# Patient Record
Sex: FEMALE | Race: WHITE
[De-identification: names, ages, dates, MRNs, and addresses within clinical notes are randomized per-mention and may not be internally consistent; named-entity substitution may affect disease eponyms.]

---

## 2020-02-28 ENCOUNTER — HOSPITAL ENCOUNTER (OUTPATIENT)
Dept: HOSPITAL 50 - VM.ED | Age: 85
Setting detail: OBSERVATION
LOS: 3 days | Discharge: NURSING, REHAB OR LONG-TERM CARE | End: 2020-03-02
Attending: FAMILY MEDICINE | Admitting: PHYSICIAN ASSISTANT
Payer: MEDICARE

## 2020-02-28 DIAGNOSIS — Z91.048: ICD-10-CM

## 2020-02-28 DIAGNOSIS — I13.0: ICD-10-CM

## 2020-02-28 DIAGNOSIS — N18.9: ICD-10-CM

## 2020-02-28 DIAGNOSIS — F41.9: ICD-10-CM

## 2020-02-28 DIAGNOSIS — S42.212A: ICD-10-CM

## 2020-02-28 DIAGNOSIS — Z88.1: ICD-10-CM

## 2020-02-28 DIAGNOSIS — Z79.82: ICD-10-CM

## 2020-02-28 DIAGNOSIS — Z88.8: ICD-10-CM

## 2020-02-28 DIAGNOSIS — S01.01XA: ICD-10-CM

## 2020-02-28 DIAGNOSIS — S52.92XA: ICD-10-CM

## 2020-02-28 DIAGNOSIS — Z79.51: ICD-10-CM

## 2020-02-28 DIAGNOSIS — S52.202A: ICD-10-CM

## 2020-02-28 DIAGNOSIS — Z79.899: ICD-10-CM

## 2020-02-28 DIAGNOSIS — E83.119: ICD-10-CM
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
## 2020-02-28 DIAGNOSIS — Z23: ICD-10-CM

## 2020-02-28 DIAGNOSIS — D50.0: ICD-10-CM

## 2020-02-28 DIAGNOSIS — N39.0: ICD-10-CM

## 2020-02-28 DIAGNOSIS — Z79.02: ICD-10-CM

## 2020-02-28 DIAGNOSIS — E87.1: ICD-10-CM

## 2020-02-28 DIAGNOSIS — J45.909: ICD-10-CM

## 2020-02-28 DIAGNOSIS — E83.42: ICD-10-CM

## 2020-02-28 DIAGNOSIS — S42.292A: Primary | ICD-10-CM

## 2020-02-28 DIAGNOSIS — I48.0: ICD-10-CM

## 2020-02-28 DIAGNOSIS — I50.22: ICD-10-CM

## 2020-02-28 PROCEDURE — 81002 URINALYSIS NONAUTO W/O SCOPE: CPT

## 2020-02-28 PROCEDURE — 85610 PROTHROMBIN TIME: CPT

## 2020-02-28 PROCEDURE — 99285 EMERGENCY DEPT VISIT HI MDM: CPT

## 2020-02-28 PROCEDURE — 73060 X-RAY EXAM OF HUMERUS: CPT

## 2020-02-28 PROCEDURE — 70450 CT HEAD/BRAIN W/O DYE: CPT

## 2020-02-28 PROCEDURE — 72125 CT NECK SPINE W/O DYE: CPT

## 2020-02-28 PROCEDURE — 96374 THER/PROPH/DIAG INJ IV PUSH: CPT

## 2020-02-28 PROCEDURE — 36415 COLL VENOUS BLD VENIPUNCTURE: CPT

## 2020-02-28 PROCEDURE — 71045 X-RAY EXAM CHEST 1 VIEW: CPT

## 2020-02-28 PROCEDURE — 90471 IMMUNIZATION ADMIN: CPT

## 2020-02-28 PROCEDURE — 97165 OT EVAL LOW COMPLEX 30 MIN: CPT

## 2020-02-28 PROCEDURE — 99284 EMERGENCY DEPT VISIT MOD MDM: CPT

## 2020-02-28 PROCEDURE — 96375 TX/PRO/DX INJ NEW DRUG ADDON: CPT

## 2020-02-28 PROCEDURE — 73110 X-RAY EXAM OF WRIST: CPT

## 2020-02-28 PROCEDURE — 80053 COMPREHEN METABOLIC PANEL: CPT

## 2020-02-28 PROCEDURE — 12001 RPR S/N/AX/GEN/TRNK 2.5CM/<: CPT

## 2020-02-28 PROCEDURE — G0378 HOSPITAL OBSERVATION PER HR: HCPCS

## 2020-02-28 PROCEDURE — 73030 X-RAY EXAM OF SHOULDER: CPT

## 2020-02-28 PROCEDURE — 80048 BASIC METABOLIC PNL TOTAL CA: CPT

## 2020-02-28 PROCEDURE — 90715 TDAP VACCINE 7 YRS/> IM: CPT

## 2020-02-28 PROCEDURE — 29125 APPL SHORT ARM SPLINT STATIC: CPT

## 2020-02-28 PROCEDURE — 83735 ASSAY OF MAGNESIUM: CPT

## 2020-02-28 PROCEDURE — 73130 X-RAY EXAM OF HAND: CPT

## 2020-02-28 PROCEDURE — 85025 COMPLETE CBC W/AUTO DIFF WBC: CPT

## 2020-02-28 NOTE — EDM.PDOC
ED HPI GENERAL MEDICAL PROBLEM





- General


Time Seen by Provider: 02/28/20 23:19


Source of Information: Reports: Patient, EMS


History Limitations: Reports: No Limitations





- History of Present Illness


INITIAL COMMENTS - FREE TEXT/NARRATIVE: 





Pt. states that she tripped over a rug at home, causing her to fall and strike 

her head and injure her L wrist. Pt. states that she had to crawl to get to her 

phone to call 911. Pt. did not have any LOC and recalls the entire event. 

Denies any neck pain. Pt. denies any head pain, but complains of 8/10 pain to 

her hand and wrist.


EMS called a trauma code due to patient's age and due to the fact that she is 

on Plavix. She is not anticoagulated.


Pt. denies any hip or pelvis pain. She was able to ambulate and stand without 

difficulty. She denied any lightheadedness when changing positions. Denies any 

nausea, vomiting, blurred vision.


Pt. states that she has been experiencing dysuria and frequency of urination. 

According to her chart, she called the clinic yesterday but decision was made 

for patient to follow-up in clinic for an office visit next week. She denies 

any fever or chills, back or flank pain. She states that she has not been 

experiencing any weakness, lightheadedness, chest pain, or shortness of breath.


Her complaints include discomfort to L wrist, L hand, and L humerus.





Onset: Today


Onset Date: 02/28/20


Location: Reports: Head, Upper Extremity, Left


Quality: Reports: Ache, Sharp


Severity: Moderate





- Related Data


 Allergies











Allergy/AdvReac Type Severity Reaction Status Date / Time


 


amoxicillin [Amoxicillin] Allergy  Swollen Verified 04/26/15 07:32





   Tongue  


 


doxycycline Allergy  Rash Verified 04/26/15 07:32


 


seasonal Allergy  Other Uncoded 02/28/20 23:48


 


wasp Allergy  Anaphylactic Uncoded 02/28/20 23:48





   Shock  











Home Meds: 


 Home Meds





Aspirin [Ecotrin] 162 mg PO DAILY 03/11/15 [History]


Calcium Carbonate/Vitamin D3 [Caltrate 600 + D Tablet] 1 tab PO DAILY 03/11/15 [

History]


Fluticasone Propionate [Flovent HFA] 2 puff INH BID PRN 03/11/15 [History]


Lutein 20 mg PO DAILY 03/11/15 [History]


Simvastatin 10 mg PO DAILY 03/11/15 [History]


timoloL maleate [Timoptic 0.5% Ophth Soln] 1 drop EYEBOTH BID 03/11/15 [History]


Albuterol [Ventolin HFA] 2 puff .XX Q4H PRN 02/28/20 [History]


Amiodarone HCl [Pacerone] 100 mg PO DAILY 02/28/20 [History]


Carboxymethylcellulos/Glycerin [Refresh Optive Gel Eye Drops] 10 ml OP 

ASDIRECTED 02/28/20 [History]


Clopidogrel [Plavix] 75 mg PO DAILY 02/28/20 [History]


Fenofibrate Nanocrystallized [Tricor] 145 mg PO DAILY 02/28/20 [History]


Furosemide 1 tab PO DAILY 02/28/20 [History]


Multivitamin with Minerals [Multivitamins with Minerals] 1 each PO DAILY 02/28/ 20 [History]


Nitroglycerin [Nitrostat] 0.4 mg SL ASDIRECTED PRN 02/28/20 [History]


Omeprazole 20 mg PO DAILY 02/28/20 [History]


Simethicone 80 mg PO Q4H PRN 02/28/20 [History]


Sodium Chloride 5% [Barbara 128 5% Ophth Oint] 3.5 gm EYEBOTH BEDTIME 02/28/20 [

History]


amLODIPine [Norvasc] 5 mg PO DAILY 02/28/20 [History]


carvediloL [Coreg] 6.25 mg PO BID 02/28/20 [History]


clonazePAM [Clonazepam] 0.5 tab PO QAM 02/28/20 [History]


clonazePAM [Clonazepam] 1 tab PO BEDTIME 02/28/20 [History]


cycloSPORINE [Restasis] 1 each OP BID 02/28/20 [History]


lisinopriL [Lisinopril] 1 tab PO BID 02/28/20 [History]


minoxidiL [Minoxidil] 60 ml TP ASDIRECTED 02/28/20 [History]











Past Medical History


OB/GYN History: Reports: Pregnancy





ED ROS GENERAL





- Review of Systems


Review Of Systems: See Below


Constitutional: Reports: No Symptoms.  Denies: Fever, Chills, Malaise, Weakness


HEENT: Reports: Other (Abrasion to head)


Respiratory: Reports: No Symptoms


Cardiovascular: Reports: No Symptoms


Endocrine: Reports: No Symptoms


GI/Abdominal: Reports: No Symptoms


: Reports: No Symptoms


Musculoskeletal: Reports: Arm Pain (L upper arm), Hand Pain, Joint Pain, Joint 

Swelling (wrist pain)


Skin: Reports: No Symptoms


Neurological: Reports: No Symptoms.  Denies: Confusion, Dizziness, Headache, 

Numbness, Seizure, Syncope, Tingling, Weakness


Psychiatric: Reports: No Symptoms


Hematologic/Lymphatic: Reports: No Symptoms


Immunologic: Reports: No Symptoms





ED EXAM, GENERAL





- Physical Exam


Exam: See Below


Exam Limited By: No Limitations


General Appearance: Alert, WD/WN, No Apparent Distress


Eye Exam: Bilateral Eye: EOMI, Normal Fundi, Normal Inspection, PERRL


Throat/Mouth: Normal Inspection, Normal Lips, Normal Teeth, Normal Gums, Normal 

Oropharynx, Normal Voice, No Airway Compromise


Neck: Normal Inspection, Supple, Non-Tender, Full Range of Motion


Respiratory/Chest: No Respiratory Distress, Lungs Clear, Normal Breath Sounds, 

No Accessory Muscle Use, Chest Non-Tender


Cardiovascular: Normal Peripheral Pulses, Regular Rate, Rhythm, No Edema, No JVD

, No Murmur, No Rub


Peripheral Pulses: 4+: Radial (L)


GI/Abdominal: Normal Bowel Sounds, Soft, Non-Tender, No Distention, No Mass, 

Pelvis Stable


 (Female) Exam: Deferred


Rectal (Female) Exam: Deferred


Back Exam: Normal Inspection, Full Range of Motion


Extremities: Joint Swelling, Arm Pain, Limited Range of Motion, Other (

decreased ROM/ecchymosis to L wrist/hand)


Neurological: Alert, Oriented, CN II-XII Intact, Normal Cognition, Normal Gait, 

Normal Reflexes, No Motor/Sensory Deficits


Psychiatric: Normal Affect, Normal Mood


Skin Exam: Warm, Dry, Intact, Normal Color, No Rash





ED GENERAL MEDICAL PROCEDURES





- Laceration/Wound Repair


  ** Posterior Head


Lac/wound length in cm: 2.5


Appearance: Subcutaneous


Anesthetic Type: Other (None)


Skin Prep: Chlorhexidine (Hibiciens), Saline, Sterile Drape


Exploration/Debridement/Repair: Wound Explored


Closed with: Staples


# of Sutures: 4





- Splinting


  ** Left Upper Extremity


Splint Site: volar 


Pre-procedure NV status: Normal


Post-procedure NV status: Normal


Splint Material: Fiberglass


Splint Design: Volar


Applied & Form Fitted By: Provider, Nurse


Provider Post-Splint Application NV Check: NV Status Normal, Good Position


Complications: No





Course





- Orders/Labs/Meds


Orders: 


 Active Orders 24 hr











 Category Date Time Status


 


 Cervical Spine wo Cont [CT] Stat Exams  02/28/20 23:20 Ordered


 


 Chest 1V Frontal [CR] Stat Exams  02/28/20 23:21 Ordered


 


 Hand Comp Min 3V Lt [CR] Stat Exams  02/28/20 23:22 Ordered


 


 Head wo Cont [CT] Stat Exams  02/28/20 23:19 Ordered


 


 Humerus Lt [CR] Stat Exams  02/29/20 00:15 Ordered


 


 Wrist Comp Min 3V Lt [CR] Stat Exams  02/28/20 23:22 Ordered


 


 COMPREHENSIVE METABOLIC PN,CMP [CHEM] Stat Lab  02/28/20 23:23 Ordered


 


 MAGNESIUM [CHEM] Stat Lab  02/28/20 23:23 Ordered


 


 UA W/MICROSCOPIC [URIN] Stat Lab  02/28/20 23:23 Ordered











Labs: 


 Laboratory Tests











  02/28/20 02/28/20 Range/Units





  23:44 23:44 


 


WBC  9.3   (4.0-10.0)  x10^3/uL


 


RBC  3.38 L   (4.00-5.50)  x10^6/uL


 


Hgb  11.3 L   (12.0-16.0)  g/dL


 


Hct  32.6 L   (33.0-47.0)  %


 


MCV  96.4 H   (78.0-93.0)  fL


 


MCH  33.4 H   (26.0-32.0)  pg


 


MCHC  34.7   (32.0-36.0)  g/dL


 


RDW Coeff of Maryan  14.1   (10.0-15.0)  %


 


Plt Count  192   (130-400)  x10^3/uL


 


Neut % (Auto)  77.6   (50.0-80.0)  %


 


Lymph % (Auto)  13.1 L   (25.0-50.0)  %


 


Mono % (Auto)  7.0   (2.0-11.0)  %


 


Eos % (Auto)  2.2   (0.0-4.0)  %


 


Baso % (Auto)  0.1 L   (0.2-1.2)  %


 


PT   12.4  (10.0-12.8)  SEC


 


INR   1.1 L  (2.0-3.5)  











Meds: 


Medications














Discontinued Medications














Generic Name Dose Route Start Last Admin





  Trade Name Freq  PRN Reason Stop Dose Admin


 


Morphine Sulfate  4 mg  02/28/20 23:50  02/29/20 00:04





  Morphine  IVPUSH  02/28/20 23:51  4 mg





  ONETIME ONE   Administration





     





     





     





     


 


Ondansetron HCl  4 mg  02/28/20 23:51  02/29/20 00:04





  Zofran  IVPUSH  02/28/20 23:52  4 mg





  ONETIME ONE   Administration





     





     





     





     














- Radiology Interpretation


Free Text/Narrative:: 





CT brain negative for acute pathology


CT cervical spine negative for acute pathology


3 view L wrist reveals distal radius and ulna fractures


L humerus radiographs reveal fracture of proximal humerus near glenoid


chest x-ray did not reveal any acute pathology








Departure





- Departure


Time of Disposition: 01:26


Disposition: Refer to Observation


Clinical Impression: 


 Radius and ulna distal fracture, Fracture of humerus, proximal, left, closed, 

Scalp laceration








- Discharge Information


Referrals: 


PCP,Unobtain [Primary Care Provider] - 





Sepsis Event Note





- Focused Exam


Date Exam was Performed: 02/29/20


Time Exam was Performed: 00:16





- Problem List Review


Problem List Initiated/Reviewed/Updated: Yes





- My Orders


Last 24 Hours: 


My Active Orders





02/28/20 23:19


Head wo Cont [CT] Stat 





02/28/20 23:20


Cervical Spine wo Cont [CT] Stat 





02/28/20 23:21


Chest 1V Frontal [CR] Stat 





02/28/20 23:22


Hand Comp Min 3V Lt [CR] Stat 


Wrist Comp Min 3V Lt [CR] Stat 





02/28/20 23:23


COMPREHENSIVE METABOLIC PN,CMP [CHEM] Stat 


MAGNESIUM [CHEM] Stat 


UA W/MICROSCOPIC [URIN] Stat 





02/29/20 00:15


Humerus Lt [CR] Stat 














- Assessment/Plan


Admission H&P: Please use this note as an admission H&P


Last 24 Hours: 


My Active Orders





02/28/20 23:19


Head wo Cont [CT] Stat 





02/28/20 23:20


Cervical Spine wo Cont [CT] Stat 





02/28/20 23:21


Chest 1V Frontal [CR] Stat 





02/28/20 23:22


Hand Comp Min 3V Lt [CR] Stat 


Wrist Comp Min 3V Lt [CR] Stat 





02/28/20 23:23


COMPREHENSIVE METABOLIC PN,CMP [CHEM] Stat 


MAGNESIUM [CHEM] Stat 


UA W/MICROSCOPIC [URIN] Stat 





02/29/20 00:15


Humerus Lt [CR] Stat 











Plan: 





Pt. will be admitted observation. I spoke with Dr. Starr, Orthopedics at 

[facility] in [place]. He advised splinting the wrist and placing the patient 

in a sling and having the patient follow-up in clinic in a week. She does not 

meet acute criteria but will be admitted observation now. Patient will be 

started on Norco for pain control. She will be followed by Kenroy Castro in the 

morning. All questions were answered. Pt. is a code 1.

## 2020-02-29 LAB
ANION GAP SERPL CALC-SCNC: 13.8 MMOL/L (ref 10–20)
CHLORIDE SERPL-SCNC: 100 MMOL/L (ref 98–107)
SODIUM SERPL-SCNC: 136 MMOL/L (ref 136–145)

## 2020-02-29 RX ADMIN — TIMOLOL MALEATE SCH DROP: 5 SOLUTION OPHTHALMIC at 21:49

## 2020-02-29 RX ADMIN — Medication SCH TAB: at 08:30

## 2020-02-29 RX ADMIN — OMEPRAZOLE SCH MG: 20 CAPSULE, DELAYED RELEASE ORAL at 06:08

## 2020-02-29 RX ADMIN — FENOFIBRATE SCH: 134 CAPSULE ORAL at 08:38

## 2020-02-29 RX ADMIN — TIMOLOL MALEATE SCH DROP: 5 SOLUTION OPHTHALMIC at 08:31

## 2020-02-29 RX ADMIN — HYDROCODONE BITARTRATE AND ACETAMINOPHEN PRN TAB: 5; 325 TABLET ORAL at 16:37

## 2020-02-29 RX ADMIN — Medication SCH: at 04:52

## 2020-02-29 RX ADMIN — MAGNESIUM OXIDE TAB 400 MG (241.3 MG ELEMENTAL MG) SCH TAB: 400 (241.3 MG) TAB at 08:30

## 2020-02-29 NOTE — PN
Progress Note for GRAYSON CARL  Date:  02/29/2020  Room #:  VM.204

 

CHIEF COMPLAINT:  Fall.

 

HISTORY OF PRESENT ILLNESS:  An 84-year-old female patient, who was seen in the

emergency room last evening after she sustained a fall at home.  The patient

states that she was ambulating in her bathroom when she tripped and fell,

hitting her head on the lip of the tub and landing on her left side.  The

patient states that it took her quite some time to reach her telephone to call

911 as she was not able to get up from the floor.  Upon arrival to the emergency

room at [facility], the patient was alert and oriented.  The patient had

denied any LOC, the patient remembers the entire incident.  A trauma code was

called on this patient given her age and she is also on Plavix.  The patient did

not have any hip or pelvic pain.  The patient was able to ambulate in the

emergency room and stand without any difficulty.  The patient did not have any

focal neurological deficit.  The patient had complained of dysuria and

frequency.  Her UA on arrival was negative.  The patient did not have any chest

pain or shortness of breath.  Patient had denied any abdominal complaints.  The

patient did not have any headache, dizziness, or lightheadedness.  The patient

did sustain a 2.5-cm posterior scalp laceration which was closed with 4 staples

in the emergency room.  X-rays were obtained of the patient's left upper

extremity.  The CT of the head was negative as well as the chest x-ray.  The CT

of the C-spine was also negative.  However, the x-ray of the patient's left hand

showed an impacted distal left radial and ulnar metaphysis fracture with volar

displacement of the distal radius.  The x-ray of the humerus revealed impacted

left humeral neck fracture.  The emergency room provider did contact Orthopedics

at [facility] who recommended splinting and placing the left upper extremity

in a sling.  The patient was admitted to observation for pain control and to

observe for any neurovascular compromise of the left upper extremity.

 

REVIEW OF SYSTEMS:

General:  Negative.

Respiratory:  Negative.

Cardiovascular:  Negative.

Abdomen:  Negative.

Musculoskeletal:  The patient complains of some left upper extremity pain,

however, she states it appears to be well controlled; patient has full range of

motion and use of the right upper extremity.

Neurological: Patient denies any headaches.  Patient denies any neck or back

pain.  The patient denies any dizziness or lightheadedness.

 

PHYSICAL EXAMINATION:

General Presentation:  The patient is alert.  Patient is cooperative.  Patient

does not appear to be in any acute distress.

Respiratory:  Lungs are clear to auscultation bilaterally.

Cardiovascular:  Regular rate and rhythm, no murmurs.

Abdomen:  Soft, nontender.  Bowel sounds are active x4.

Musculoskeletal:  The patient is tender along the proximal humerus.  Unable to

assess the wrist and forearm as it is currently in a splint.

Neurological:  The patient is alert and oriented x3.  Sensation is intact.

 

LABORATORY WORK:

1. CBC, white blood cell count 9.3, hemoglobin 11.3, hematocrit 32.6,

    platelets are 192,000.

2. Coagulation studies, PT/INR 1.1.

3. CMP, sodium 136, potassium 3.8, chloride 100, CO2 is 26, anion gap is 13.8,

    BUN is 26, creatinine 1.2, GFR is 43, glucose 149, calcium 8.7, bilirubin

    0.5, AST 73, ALT is 53, alkaline phosphatase is 98, total protein 6.7,

    albumin 3.3.

4. Magnesium 1.5.

5. UA:  pH is 6.5, specific gravity 1.015, negative for leukocyte esterase,

    negative wbc's, small occult blood, negative nitrites.

 

ASSESSMENT:

1. Impacted distal left radial and ulnar metaphysis fracture with volar

    displacement of distal radius.

2. Impacted left humeral neck fracture.

3. Fall at home.

4. Mixed hyperlipidemia.

5. Coronary artery disease.

6. Hypertension.

7. Gastroesophageal reflux disease.

 

PLAN:  The patient will stay on the Observation Unit through today for pain

control and continued close monitoring and neurovascular status of the left

upper extremity.  I will give the patient 500 mL of normal saline to help with

mild dehydration.  The patient will also be given 2 g of IV magnesium today.

Continue with pain control with p.o. [place].  The patient's left upper extremity

will remain in the splint and the sling.  The patient reports that she does live

alone [place] of town.  She does have a son who also lives out of town.

Concern is for self cares at home.  The patient really does not have any

assistance.  We will get Case Management involved if

the patient is still admitted this Monday.  We will continue to follow.

 

 

TB:  02/29/2020 09:48:47  MODL:  02/29/2020 12:16:41

Job #:  [id number]

## 2020-02-29 NOTE — CR
______________________________________________________________________________   

  

[id number] RAD/RAD Hand Left 3V  

EXAM:   

   

 RAD Hand Left 3V  

   

 CLINICAL DATA:   

   

 TRAUMA  

   

 COMPARISON:   

   

 NO PREVIOUS SIMILAR EXAM IS AVAILABLE.  

   

 FINDINGS:   

   

 Impacted distal left radial and ulnar metaphyseal fractures are seen with   

   

 volar displacement of the distal left radial fracture.  

   

 IMPRESSION:  

   

 DISTAL LEFT RADIAL AND ULNAR FRACTURES  

   

 Electronically signed by Michael Guzman MD on 2/29/2020 9:01 AM  

   

  

Michael Guzman MD                 

 02/29/20 0903    

  

Thank you for allowing us to participate in the care of your patient.

## 2020-02-29 NOTE — CR
______________________________________________________________________________   

  

[id number] RAD/RAD Humerus Left 2V  

EXAM:   

   

 RAD Humerus Left 2V  

   

 CLINICAL DATA:   

   

 TRAUMA  

   

 COMPARISON:   

   

 NO PREVIOUS SIMILAR EXAM IS AVAILABLE.  

   

 FINDINGS:   

   

 A complex impacted comminuted proximal left humeral neck fracture is seen.  

   

 IMPRESSION:  

   

 SINGLE VIEW DEMONSTRATING IMPACTED LEFT HUMERAL NECK FRACTURE  

   

 Electronically signed by Michael Guzman MD on 2/29/2020 8:59 AM  

   

  

Michael Guzman MD                 

 02/29/20 0901    

  

Thank you for allowing us to participate in the care of your patient.

## 2020-02-29 NOTE — CT
______________________________________________________________________________   

  

[id number] CT/CT Head WO IV  

EXAM:   

   

 CT Head WO IV  

   

 CLINICAL DATA:   

   

 TRAUMA  

   

 COMPARISON:   

   

 NO PREVIOUS SIMILAR EXAM IS AVAILABLE FOR COMPARISON.  

   

 FINDINGS:   

   

 There is no mass or mass effect.  

   

 There is no hemorrhage or hydrocephalus.  

   

 There are no extra-axial fluid collections.  

   

 There are no sites of abnormal attenuation.  

   

 IMPRESSION:  

   

 NO PLAIN CT EVIDENCE OF ACUTE INTRACRANIAL PROCESS.  

   

 Electronically signed by Michael Guzman MD on 2/29/2020 9:06 AM  

   

  

Michael Guzman MD                 

 02/29/20 0909    

  

Thank you for allowing us to participate in the care of your patient.

## 2020-02-29 NOTE — CT
______________________________________________________________________________   

  

[id number] CT/CT Cervical Spine WO IV  

Exam:   

   

 CT Cervical Spine WO IV  

   

 Clinical Data:   

   

 TRAUMA  

   

 COMPARISON:   

   

 NO PREVIOUS SIMILAR EXAM IS AVAILABLE  

   

 FINDINGS:   

   

 No fracture or subluxation is seen  

   

 There are multilevel degenerative changes  

   

 The prevertebral soft tissues are unremarkable   

   

 IMPRESSION:  

   

 NO FRACTURE OR SUBLUXATION  

   

 Electronically signed by Michael Guzman MD on 2/29/2020 9:04 AM  

   

  

Michael Guzman MD                 

 02/29/20 0907    

  

Thank you for allowing us to participate in the care of your patient.

## 2020-02-29 NOTE — CR
______________________________________________________________________________   

  

[id number] RAD/RAD Chest PA or AP 1V  

EXAM:  

   

 SINGLE VIEW CHEST.  

   

 INDICATION:  

   

 TRAUMA  

   

 COMPARISON:  

   

 NO PREVIOUS SIMILAR EXAM IS AVAILABLE  

   

 FINDINGS:  

   

 The lungs are clear  

   

 There is no pneumothorax  

   

 The cardiomediastinal contour is enlarged  

   

 The left humeral neck fracture is seen  

   

 IMPRESSION:  

   

 NO ACUTE CARDIOPULMONARY PROCESS  

   

 Electronically signed by Michael Guzman MD on 2/29/2020 9:00 AM  

   

  

Michael Guzman MD                 

 02/29/20 0902    

  

Thank you for allowing us to participate in the care of your patient.

## 2020-02-29 NOTE — CR
______________________________________________________________________________   

  

[id number] RAD/RAD Wrist Left 3V Min  

EXAM:   

   

 RAD Wrist Left 3V Min  

   

 CLINICAL DATA:   

   

 TRAUMA  

   

 COMPARISON:   

   

 NO PREVIOUS SIMILAR EXAM IS AVAILABLE.  

   

 FINDINGS:   

   

 Distal left radial and ulnar fractures are seen  

   

 Pre-existing degenerative changes are identified elsewhere.  

   

 IMPRESSION:  

   

 DISTAL LEFT RADIAL AND ULNAR FRACTURES  

   

 ARTICULAR INVOLVEMENT  

   

 Electronically signed by Michael Guzman MD on 2/29/2020 9:02 AM  

   

  

Michael Guzman MD                 

 02/29/20 0904    

  

Thank you for allowing us to participate in the care of your patient.

## 2020-03-01 RX ADMIN — Medication SCH TAB: at 07:57

## 2020-03-01 RX ADMIN — OMEPRAZOLE SCH: 20 CAPSULE, DELAYED RELEASE ORAL at 07:07

## 2020-03-01 RX ADMIN — HYDROCODONE BITARTRATE AND ACETAMINOPHEN PRN TAB: 5; 325 TABLET ORAL at 20:48

## 2020-03-01 RX ADMIN — MAGNESIUM OXIDE TAB 400 MG (241.3 MG ELEMENTAL MG) SCH TAB: 400 (241.3 MG) TAB at 07:58

## 2020-03-01 RX ADMIN — FENOFIBRATE SCH MG: 134 CAPSULE ORAL at 07:56

## 2020-03-01 RX ADMIN — TIMOLOL MALEATE SCH DROP: 5 SOLUTION OPHTHALMIC at 08:00

## 2020-03-01 RX ADMIN — HYDROCODONE BITARTRATE AND ACETAMINOPHEN PRN TAB: 5; 325 TABLET ORAL at 00:41

## 2020-03-01 RX ADMIN — OMEPRAZOLE SCH MG: 20 CAPSULE, DELAYED RELEASE ORAL at 05:33

## 2020-03-01 RX ADMIN — TIMOLOL MALEATE SCH DROP: 5 SOLUTION OPHTHALMIC at 19:58

## 2020-03-01 NOTE — PN
Progress Note for GRAYSON CARL  Date:  03/01/2020  Room #:  VM.204

 

CHIEF COMPLAINT:  Fall.

 

SUBJECTIVE:  An 84-year-old female patient was admitted to the Observation Unit

at [facility] last Friday evening after she fell at home and sustained a

wrist fracture and humeral fracture.  The patient had been ambulating to her

bathroom when she slipped and fell on a scatter rug hitting her head on the lip

of the tub and landing on her left side.  The patient currently has her left

upper extremity in a sling and the left wrist fracture is splinted.

 

The patient denies any neurological deficits.  Patient has not had any shortness

of breath or chest pain.  The patient is eating well.  The patient is fairly

independent in the room for the most part.  The patient continues to have pain

of the left upper extremity.  She has been receiving p.r.n. Norco.

 

REVIEW OF SYSTEMS:

General Presentation:  Negative.

Respiratory:  Negative.

Cardiovascular:  Negative.

Musculoskeletal:  Pain of the left upper extremity.

Skin:  Intact.

Neurological:  Negative.

 

PHYSICAL EXAMINATION:

General Presentation:  Patient is alert.  Patient is lying comfortable in the

bed.  The patient does not appear to be in any acute distress.

Respiratory:  Lungs are clear to auscultation.

Cardiovascular:  Regular rate and rhythm, no murmur.

Musculoskeletal:  Pain with movement of the left upper extremity, currently in a

sling.  Splint is intact.  CMS is intact.

Skin:  Posterior scalp staples are intact.  No evidence of infection.

Neurological:  Patient is alert and oriented x3.

 

LABORATORY WORK:  None.

 

IMAGING STUDIES:  None.

 

ASSESSMENT:

1. Impacted distal left radial and ulnar metaphysis fracture with volar

    displacement of the distal radius.

2. Impacted left humeral neck fracture.

3. Fall at home.

4. Mixed hyperlipidemia.

5. Coronary artery disease.

6. Hypertension.

7. Gastroesophageal reflux disease.

 

PLAN:  Discussed with the patient today that we would need to speak with her

family regarding placement.  The patient would require some assistance at home.

For patient's safety, I would not recommend that she go back to her farm alone.

The patient does need to have a followup with Orthopedics this week.  The

patient did mention something about swing bed, however, this would have to be

self-pay.  The patient's pain has been under good control on the Norco.  We will

continue current observation cares for now and visit with family as far as

disposition.

 

 

TB:  03/01/2020 09:01:37  MODL:  03/01/2020 12:36:09

Job #:  [id number]

## 2020-03-02 ENCOUNTER — HOSPITAL ENCOUNTER (INPATIENT)
Dept: HOSPITAL 50 - VM.MS | Age: 85
LOS: 1 days | Discharge: HOME HEALTH | DRG: 560 | End: 2020-03-03
Attending: FAMILY MEDICINE | Admitting: FAMILY MEDICINE
Payer: MEDICARE

## 2020-03-02 VITALS — SYSTOLIC BLOOD PRESSURE: 110 MMHG | DIASTOLIC BLOOD PRESSURE: 41 MMHG | HEART RATE: 70 BPM
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
## 2020-03-02 DIAGNOSIS — N18.3: ICD-10-CM

## 2020-03-02 DIAGNOSIS — J45.909: ICD-10-CM

## 2020-03-02 DIAGNOSIS — I50.22: ICD-10-CM

## 2020-03-02 DIAGNOSIS — S01.01XD: ICD-10-CM

## 2020-03-02 DIAGNOSIS — S52.92XD: Primary | ICD-10-CM

## 2020-03-02 DIAGNOSIS — I13.0: ICD-10-CM

## 2020-03-02 DIAGNOSIS — R30.0: ICD-10-CM

## 2020-03-02 DIAGNOSIS — I25.10: ICD-10-CM

## 2020-03-02 DIAGNOSIS — S42.302D: ICD-10-CM

## 2020-03-02 DIAGNOSIS — D50.0: ICD-10-CM

## 2020-03-02 DIAGNOSIS — E87.1: ICD-10-CM

## 2020-03-02 DIAGNOSIS — I48.0: ICD-10-CM

## 2020-03-02 DIAGNOSIS — F41.9: ICD-10-CM

## 2020-03-02 DIAGNOSIS — E83.42: ICD-10-CM

## 2020-03-02 LAB
ANION GAP SERPL CALC-SCNC: 12.8 MMOL/L (ref 10–20)
CHLORIDE SERPL-SCNC: 97 MMOL/L (ref 98–107)
SODIUM SERPL-SCNC: 131 MMOL/L (ref 136–145)

## 2020-03-02 RX ADMIN — TIMOLOL MALEATE SCH DROP: 5 SOLUTION OPHTHALMIC at 22:41

## 2020-03-02 RX ADMIN — Medication SCH TAB: at 09:50

## 2020-03-02 RX ADMIN — FENOFIBRATE SCH MG: 134 CAPSULE ORAL at 09:44

## 2020-03-02 RX ADMIN — ACETAMINOPHEN SCH MG: 650 TABLET, FILM COATED, EXTENDED RELEASE ORAL at 17:56

## 2020-03-02 RX ADMIN — MAGNESIUM OXIDE TAB 400 MG (241.3 MG ELEMENTAL MG) SCH TAB: 400 (241.3 MG) TAB at 10:53

## 2020-03-02 RX ADMIN — TIMOLOL MALEATE SCH DROP: 5 SOLUTION OPHTHALMIC at 10:02

## 2020-03-02 RX ADMIN — OMEPRAZOLE SCH MG: 20 CAPSULE, DELAYED RELEASE ORAL at 06:10

## 2020-03-02 NOTE — PN
Progress Note for GRAYSON CARL  Date:  2020  Room #:  VM.204

 

SUBJECTIVE:  The patient is an 84-year-old female who was admitted on 2020

with having had a fall that occurred on 2020 at home.  She tripped over

rugs and she sustained a left humeral head fracture as well as a left impacted

Colles fracture.  She had head CT as well as cervical spine that were negative.

The patient was placed on observation as Ortho wanted to see her in the clinic

in about a week's time.  The patient was admitted for pain control as well as

assistance with ADLs as she lives home alone.  To note, since she has been here,

she has had some pain, some difficulty transferring out of her bed.  She has

been on observation for 2 days now and needs to be either placed on self-pay

swing bed versus assisted living with services.  The patient has not had a

chance yet to visit with  about this.  It was noted that last

night, she did become confused when she was given her pain pill.  Also, her lab

work had shown that her hemoglobin has dropped because of the blood loss.  To

note, the patient does have a history of polycythemia.  The patient had had

problems with frequency before coming to the hospital, but she says after coming

to the hospital, she has not had any frequency.  She does not drink any

caffeine.  She does try to drink frequent amount of fluids.

 

OBJECTIVE:  Vital Signs:  Her temperature is 36.4, pulse 73, blood pressure is

138/66, respiratory rate is 18, sats are 94%.

General:  The patient's arm is in a left shoulder immobilizer with her forearm

wrapped as well.  She is alert, pleasant to visit with.

Heart:  Regular rate.

Lungs:  Clear to auscultation.

Abdomen:  Bowel sounds are present.  It is soft.

Extremities:  Lower extremities, no edema. left arm in splint and sling with 
fingers well perfused and moving

Psychiatric:  The patient appears slightly anxious, which is chronic for her.

 

LABORATORY DATA:  Today shows her hemoglobin has dropped down to 8.8, white

blood cell count 8.2, platelets 158, with sodium 131, potassium 3.8, creatinine

0.8.  GFR greater than 60.  Magnesium is still slightly low at 1.6, it had been

1.5.

 

IMPRESSION:

1. Left impacted radial fracture.

2. Left humeral head fracture.

3. Anemia secondary to blood loss.

4. Hyponatremia, mild.

5. Chronic anxiety disorder.

6. Scalp laceration, which is healing.

7. Hypertension.

8. Hemochromatosis.

9. Hypomagnesemia.

10. Asthma.

 

PLAN:  We will have OT and PT services evaluate the patient today.  We will get

a 2nd view of her left shoulder.  We will place her on scheduled Tylenol to help

with pain control.  Also start iron replacement for the patient right now.  She

was placed on MiraLax to help with stools.  Anticipated later today will be

definitive care about where she will be going.  Family was present as well with

the patient.  The patient may need a different shoulder mobilizer as there is a

ring that gets in her way.  We will place her on some oral magnesium as well as

she was only given IV magnesium.

 

 

GM2020 08:29:33  MODL:  2020 09:22:22

Job #:  [id number]

FAZAL

## 2020-03-02 NOTE — CR
______________________________________________________________________________   

  

[id number] RAD/RAD Shoulder Left 2V Min  

EXAM: 3 VIEWS LEFT SHOULDER.  

   

 INDICATION: FRACTURE OF HUMERAL HEAD.  

   

 COMPARISON: None.  

   

 DISCUSSION: Multi fragmentary fracture involving the left subcapital femoral  

 neck. There are multiple bony fragments. There is medial displacement and  

 foreshortening. No definite dislocation of the left humeral head.  

   

 Moderate degenerative changes of the left acromioclavicular joint.  

   

 IMPRESSION:  

 1.  As above.  

   

 Electronically signed by Gilles Suarez MD on 3/2/2020 11:19 AM  

   

  

Gilles Suarez DO                 

 03/02/20 1122    

  

Thank you for allowing us to participate in the care of your patient.

## 2020-03-03 VITALS — DIASTOLIC BLOOD PRESSURE: 66 MMHG | SYSTOLIC BLOOD PRESSURE: 141 MMHG | HEART RATE: 73 BPM

## 2020-03-03 RX ADMIN — ACETAMINOPHEN SCH MG: 650 TABLET, FILM COATED, EXTENDED RELEASE ORAL at 07:42

## 2020-03-03 RX ADMIN — ACETAMINOPHEN SCH: 650 TABLET, FILM COATED, EXTENDED RELEASE ORAL at 03:07

## 2020-03-03 RX ADMIN — TIMOLOL MALEATE SCH DROP: 5 SOLUTION OPHTHALMIC at 07:54

## 2020-03-03 NOTE — DISCH
PRIMARY DIAGNOSES:

1. Left humeral head fracture.

2. Impacted radial fracture.

3. Anemia due to blood loss.

4. Hemochromatosis.

5. Hypertension.

6. Hyponatremia, mild.

7. Paroxysmal atrial fibrillation.

8. Chronic anxiety disorder.

9. Asthma.

10.Hypomagnesemia.

11.Dysuria, urinary tract infection ruled out.

12.Chronic kidney disease.

13.Chronic systolic heart failure.

 

SUMMARY OF HISTORY AND PHYSICAL:  The patient is an 84-year-old female who was

at home and a resident.  She tripped over some rugs on her way to the bathroom

and fell on to her left arm that was outstretched as well as hit her shoulder.

She had no loss of consciousness.  When seen in the emergency room, a trauma

code was called as she was on Plavix.  She had plain x-rays of her left wrist,

which showed an impacted fracture, intra-articular, as well as a left humeral

head fracture by 1 view.  She had a CT of her head, which was negative for

bleed.  CT of her neck, which was negative for fracture.  Her magnesium was low

at 1.5, hemoglobin normal at 11.8.

 

SUMMARY OF HOSPITAL COURSE:  She was placed on observation as she lived home

alone and needed assistance with ADLs.  She was given morphine shot initially

for pain control as well as placed on oral hydrocodone.  She was noted to become

slightly confused from the oral hydrocodone.  She had followup blood work done

on 2020.  It was noted that her hemoglobin had dropped down to 8.8 and her

white blood cell count was 8.2.  Sodium had dropped down to 131, potassium was

3.8.  Her creatinine had gone up from 1.2 to 0.8.  GFR went from 43 to greater

than 60.  Magnesium went from 1.5 to 1.6.  Her AST was 73.  Urinalysis was

checked.  It was negative for infection.  She had a further x-ray done of her

left shoulder, which showed fracture to be noted that was a multifragmented

fracture involving the left subcapital humeral neck.  There are multiple bony

fragments.  There is medial displacement and foreshortening present.  The

patient was placed in a shoulder immobilizer as well as a sling, and because of

inability to manage at home, she needed to be either placed on self-pay swing or

go to assisted living.  Assisted living did not have bed availability today, so

therefore plans were made for her to go to a swing bed.

 

To note, I did review her shoulder x-ray with on-call orthopedist today and he

did not feel that she needed any surgical correction of this.  Felt that she

should be in just a sling only and have followup x-rays done in a week's time.

With coordinating ortho care, Dr. Odom comes to [place] on 2020, so

we will set up an office consult with him, and then he can follow up with

further x-ray imaging as needed.

 

To note, face-to-face documentation was done today as the patient will need home

health at the time of discharge out of the hospital as she is homebound.  She

does not drive.  She is wearing a shoulder immobilizer.  She would depend on

others to take her to appointments.  She would need both PT as well as OT.  I

will be the one monitoring her progress with home health.

 

MEDICATIONS:  At the time of discharge were Timoptic 1 drop both eyes,

simvastatin 10 mg daily, lutein 20 mg 1 pill daily, Flovent HFA 2 puffs b.i.d.

p.r.n., calcium with vitamin D 1 puff daily, aspirin 81 mg 2 pills daily,

multivitamin 1 pill daily, simethicone 80 mg 1 q.4 hours p.r.n., Refresh eye

drops as directed, Restasis 1 drop each eye b.i.d., Barbara ointment to both eyes

at bedtime, carvedilol 6.25 mg b.i.d., omeprazole 20 mg 1 pill daily, nitro 0.4

sublingual q.5 minutes x3 p.r.n., albuterol HFA 2 puffs q.4 hours p.r.n.,

furosemide 20 mg 1 pill daily, Plavix 75 mg 1 pill daily, lisinopril 10 mg 1

pill twice a day, Tricor 145 mg 1 pill daily, clonazepam 0.5 mg 1 pill at

bedtime and half a pill in the morning, amiodarone 200 mg, she takes 100 mg

daily, amlodipine 5 mg 1 pill daily, minoxidil 60 mL topically as needed.  She

will be on scheduled acetaminophen 325 one pill 3 times a day.  Also, she will

have hydrocodone/Tylenol 325 half a pill every 4 hours as needed for pain.  The

patient was also placed on MiraLAX 17 g daily.

 

Code level status is full code.  I anticipate following the patient in the

clinic in 2 weeks' time for recheck once she has been discharged from swing bed

and is at assisted living.

 

 

GM2020 13:46:11  MODL:  2020 01:22:18

Job #:  [id number]

## 2020-03-03 NOTE — DISCH
PRIMARY DIAGNOSES:

1. Left humeral head fracture.

2. Left radial fracture.

3. Anemia secondary to blood loss.

4. Hypomagnesemia.

5. Scalp laceration.

6. Hemochromatosis.

7. Hypertension.

8. Mild hyponatremia.

9. Paroxysmal atrial fibrillation, on Plavix.

10.Chronic anxiety disorder.

11.Asthma.

12.Dysuria, urinary tract infection ruled out.

13.Chronic kidney disease, stage 3.

14.Chronic systolic heart failure.

15.Coronary artery disease.

 

SUMMARY OF HISTORY AND PHYSICAL:  Patient is an 84-year-old female who lives

home alone.  She tripped over a rug, fell onto her floor.  This occurred on

2020.  She presents to the emergency room.  She was placed on observation

because of need for help with ADLs as family was not able to help her.  She did

have a head CT as well as a neck CT because she was on Plavix.  Those were

negative.  Her initial hemoglobin was 11.8; when she left observation, it had

dropped down to 8.8.  The patient was started on iron pill 1 a day.  Her

magnesium also was low at 1.5, so she was started on magnesium daily.

Urinalysis was ruled out.  The patient did receive a shot of morphine for pain

control while on observation as well as hydrocodone.  However, the hydrocodone

made her feel quite confused and so a lower dose was tried, but this also made

her feel confused.  The patient was not placed on Lovenox for DVT prophylaxis

because she was already on Plavix and had the fall and she was otherwise up

ambulatory quite well.  She was seen by PT and OT.

 

While on swing bed, she received PT and OT as well.  Repeat hemoglobin was done,

which came back at 9.2.

 

MEDICATIONS:  At the time of discharge will be:

1. Acetaminophen 650 q.8 hours scheduled.

2. Albuterol 2 puffs q.4 hours p.r.n.

3. Amiodarone 100 mg 1 pill daily.

4. Norvasc 5 mg 1 pill daily.

5. Aspirin 162 mg 1 pill daily.

6. Calcium with vitamin D 1 pill at bedtime.

7. Coreg 6.25 mg b.i.d.

8. Clonazepam 0.5 at bedtime and 0.25 in the morning.

9. Plavix 75 mg 1 pill daily.

10.Docusate 100 mg 1 pill twice a day as needed.

11.Lasix 20 mg 1 pill daily.

12.Lisinopril 10 mg 1 pill twice a day.

13.Magnesium oxide 400 mg daily.

14.Multivitamin 1 pill daily.

15.Minoxidil 60 mL topically p.r.n.

16.Barbara eye ointment at bedtime both eyes.

17.Lutein 20 mg p.o. daily.

18.Fenofibrate 145 mg 1 pill daily.

19.Omeprazole 20 mg 1 pill daily.

20.MiraLAX 17 g daily.

21.Iron 150 mg 1 pill daily.

22.Simethicone 80 mg q.4 hours p.r.n. gas.

23.Zocor 10 mg at bedtime.

24.Timolol 0.5% eyedrops twice a day.

 

PLAN:  The patient will be seen by Ortho tomorrow in the clinic, Dr. Odom.  The

patient will return to see me in 2 weeks' time.  The patient will need her

staples out in a weeks' time.  The patient is to wear a sling on her arms at all

times.  The patient is to sleep with the head of her bed elevated.

 

The patient will have Home Health as she is homebound, she does not drive, she

would depend on others to take her to appointments.  She is unsteady of gait

because of her shoulder fracture and it makes her off balance.  The patient

cannot walk far before becoming winded.  She will need physical therapy and

occupational therapy.  I will be monitoring her progress on Home Health.

 

The patient is full code level status.  When she is seen back in the clinic, we

will need to do a CBC, basic metabolic profile, as well as magnesium level.

 

 

GM2020 08:29:16  MODL:  2020 12:01:15

Job #:  [id number]

## 2020-03-04 NOTE — PN
Progress Note for GRAYSON CARL  Date:  2020  Room #:  VM.204

 

SUBJECTIVE:  The patient says the pain pills make her too confused, so she

stopped them completely.  She is just using Tylenol and that is working for her.

She is not certain when the staples were to have come out of her head.  After

visiting with Ortho yesterday, it was felt that she did not need surgical

intervention and that she could be seen by local orthopedist tomorrow, but then

she would need x-rays most likely until the following week, and she did not need

to be in a shoulder immobilizer, just the sling.  PT and OT have been seeing her

now on swing bed.

 

OBJECTIVE:  Vital Signs:  Her temperature is 36.8, pulse 73, blood pressure is

141/66, saturations are 94% on room air, respiratory rate 18.

Skin:  Incision on her scalp is well reapproximated with staples present.  She

does have a bruise along her left neck which is healing well.

Heart:  Regular rate.

Lungs:  Clear.

Extremities:  Fingers are well perfused.  Her left upper arm, wrist is splinted

in a sling.  Lower extremities, no edema.

Abdomen:  Soft.

 

LABORATORY DATA:  Lab today shows her hemoglobin is 9.2, which has improved from

yesterday.

 

IMPRESSION:

1. Left humeral head fracture with left radial fracture.

2. Scalp laceration.

3. Anemia secondary to blood loss.

4. Hypomagnesemia.

5. Hypertension.

6. Asthma.

7. Chronic anxiety disorder.

8. Hemochromatosis.

 

PLAN:  The patient will be discharged today to [facility] with Home Health.

She will see Ortho tomorrow.  She will see me in 2 weeks.  We will have staples

taken out in a week's time.  She will continue on magnesium as well as an iron

pill until she is seen in the clinic and this will need to be rechecked in the

clinic.

 

GM2020 08:23:24  MODL:  2020 09:10:41

Job #:  [id number]

## 2020-08-06 ENCOUNTER — HOSPITAL ENCOUNTER (EMERGENCY)
Dept: HOSPITAL 50 - VM.ED | Age: 85
Discharge: HOME | End: 2020-08-06
Payer: MEDICARE

## 2020-08-06 DIAGNOSIS — Z88.8: ICD-10-CM
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
## 2020-08-06 DIAGNOSIS — Z95.5: ICD-10-CM

## 2020-08-06 DIAGNOSIS — J45.909: ICD-10-CM

## 2020-08-06 DIAGNOSIS — Z79.82: ICD-10-CM

## 2020-08-06 DIAGNOSIS — Z88.1: ICD-10-CM

## 2020-08-06 DIAGNOSIS — S00.03XA: ICD-10-CM

## 2020-08-06 DIAGNOSIS — Z79.899: ICD-10-CM

## 2020-08-06 DIAGNOSIS — S42.322A: Primary | ICD-10-CM

## 2020-08-06 DIAGNOSIS — Z79.02: ICD-10-CM

## 2020-08-06 DIAGNOSIS — I25.2: ICD-10-CM

## 2020-08-06 LAB
ANION GAP SERPL CALC-SCNC: 12.7 MMOL/L (ref 10–20)
CHLORIDE SERPL-SCNC: 97 MMOL/L (ref 98–107)
SODIUM SERPL-SCNC: 131 MMOL/L (ref 136–145)

## 2020-08-06 NOTE — CT
______________________________________________________________________________   

  

[id number] CT/CT Head WO IV  

EXAM: CT Head WO IV  

   

 CLINICAL DATA: SHORTNESS OF BREATH AFTER FALL.  

   

 COMPARISON STUDY: None  

   

 FINDINGS:  

   

 No intracranial hemorrhage, extra-axial fluid collection, mass, or acute  

 ischemia.   

   

 Generalized parenchymal atrophy with scattered areas of nonspecific white matter  

 disease, commonly seen as sequela of chronic microvascular ischemia.  

   

 Left parietal occipital scalp hematoma without underlying calvarial fracture.  

 Mild mucosal thickening of the paranasal sinuses.  

    

 IMPRESSION:  

   

 No acute intracranial process. Left parietal occipital scalp hematoma without  

 underlying calvarial fracture.  

   

 Electronically signed by Gilles Suarez MD on 8/6/2020 1:40 PM  

   

  

Gilles Suarez DO                 

 08/06/20 [id number]    

  

Thank you for allowing us to participate in the care of your patient.

## 2020-08-06 NOTE — CR
______________________________________________________________________________   

  

[id number] RAD/RAD Chest PA or AP 1V  

EXAM:  RAD Chest PA or AP 1V  

   

 INDICATION:  SOB AFTER FALL.  

   

 COMPARISON:  February 29, 2020.  

   

 DISCUSSION:    

   

 Cardiomediastinal silhouette is stable in size and contour.  

   

 No infiltrate, effusion, pneumothorax, or edema. Pulmonary hyperinflation.  

   

 No radiographic evidence of acute rib fracture.  

   

 IMPRESSION:  

 No acute cardiopulmonary abnormality.  

   

 Electronically signed by Gilles Suarez MD on 8/6/2020 2:04 PM  

   

  

Gilles Suarez DO                 

 08/06/20 1406    

  

Thank you for allowing us to participate in the care of your patient.

## 2020-08-06 NOTE — CR
______________________________________________________________________________   

  

[id number] RAD/RAD Pelvis 1V W 2V Left Hip  

EXAM: RAD Pelvis 1V W 2V Left Hip  

   

 INDICATION: FALL.  

   

 COMPARISON: None.  

   

 FINDINGS:  

 Mild osteoarthritis of the hips and mild to moderate osteoarthritis of the  

 sacroiliac joints. Lower lumbar spondylosis. No acute fracture or dislocation.  

   

 IMPRESSION:  

 1.  No acute findings.  

   

 Electronically signed by Shaquille Sykes MD on 8/6/2020 2:12 PM  

   

  

Shaquille Sykes MD                 

 08/06/20 [id number]    

  

Thank you for allowing us to participate in the care of your patient.

## 2020-08-06 NOTE — EDM.PDOC
ED HPI GENERAL MEDICAL PROBLEM





- General


Stated Complaint: fall


Time Seen by Provider: 08/06/20 12:30


Source of Information: Reports: Patient


History Limitations: Reports: No Limitations





- History of Present Illness


INITIAL COMMENTS - FREE TEXT/NARRATIVE: 





This patient comes emergency department today from the [facility] for 

further evaluation of a fall.  This morning the patient was at home when she was

ambulating across her kitchen and she saw something on the floor.  She knelt 

over at the waist to bend over and  what was on the floor and she fell 

striking her head on the floor injuring her left humerus and left hip.  She did 

not have a loss of consciousness.  She was brought to the clinic at [facility] 

where she was evaluated there to note that she had a humerus fracture.  She was 

brought to the ER for further evaluation due to the head injury and her being on

antiplatelet therapy.  Due to the mechanism of injury and the antiplatelet a 

trauma code was activated by the nursing staff upon the patient's arrival.  Upon

arrival the patient really only complains of some pain on the very top of her 

head her left mid humerus and her left hip.  She denies any neck pain or loss of

consciousness.  She denies any back pain.  She denies any visual disturbances or

diplopia.  She denies any chest pain shortness of breath or difficulty 

breathing.  No cough or congestion.  No fever no chills.  No abdominal pain.  

She does complain of left hip pain but no pelvis pain.  No injury to her right 

upper extremity or either of her lower extremities.  She denies any change in 

the functionality of her upper or lower extremities other than decreased 

movement of her left arm which was diagnosed as a transverse fracture in the 

clinic prior to arrival.  She denies any paresthesias.





- Related Data


                                    Allergies











Allergy/AdvReac Type Severity Reaction Status Date / Time


 


amoxicillin [Amoxicillin] Allergy Severe Swollen Verified 03/02/20 15:53





   Tongue  


 


doxycycline Allergy Intermediate Rash Verified 03/02/20 15:53


 


wasp Allergy Severe Anaphylactic Uncoded 03/02/20 15:53





   Shock  


 


seasonal Allergy  Other Uncoded 02/28/20 23:48











Home Meds: 


                                    Home Meds





Aspirin [Ecotrin EC] 162 mg PO DAILY 03/11/15 [History]


Calcium Carbonate/Vitamin D3 [Caltrate 600 Plus D3 Tablet] 1 tab PO DAILY 

03/11/15 [History]


Fluticasone Propionate [Flovent HFA] 2 puff INH BID PRN 03/11/15 [History]


Lutein 20 mg PO DAILY 03/11/15 [History]


Simvastatin 10 mg PO DAILY 03/11/15 [History]


timoloL maleate [Timoptic 0.5% Ophth Soln] 1 drop EYEBOTH BID 03/11/15 [History]


Albuterol [Ventolin HFA] 2 puff .XX Q4H PRN 02/28/20 [History]


Amiodarone HCl [Pacerone] 100 mg PO DAILY 02/28/20 [History]


Carboxymethylcellulos/Glycerin [Refresh Optive Gel Eye Drops] 10 ml OP 

ASDIRECTED 02/28/20 [History]


Clopidogrel [Plavix] 75 mg PO DAILY 02/28/20 [History]


Fenofibrate Nanocrystallized [Tricor] 145 mg PO DAILY 02/28/20 [History]


Furosemide 1 tab PO DAILY 02/28/20 [History]


Multivitamin with Minerals [Multivitamins with Minerals] 1 each PO DAILY 

02/28/20 [History]


Nitroglycerin [Nitrostat] 0.4 mg SL ASDIRECTED PRN 02/28/20 [History]


Omeprazole 20 mg PO DAILY 02/28/20 [History]


Simethicone 80 mg PO Q4H PRN 02/28/20 [History]


Sodium Chloride 5% [Barbara 128 5% Ophth Oint] 3.5 gm EYEBOTH BEDTIME 02/28/20 

[History]


amLODIPine [Norvasc] 5 mg PO DAILY 02/28/20 [History]


carvediloL [Coreg] 6.25 mg PO BID 02/28/20 [History]


clonazePAM [Clonazepam] 0.5 tab PO QAM 02/28/20 [History]


clonazePAM [Clonazepam] 1 tab PO BEDTIME 02/28/20 [History]


cycloSPORINE [Restasis] 1 each OP BID 02/28/20 [History]


lisinopriL [Lisinopril] 1 tab PO BID 02/28/20 [History]


minoxidiL [Minoxidil] 60 ml TP ASDIRECTED 02/28/20 [History]


Iron Polysaccharide Complex [Ferric X-150] 150 mg PO DAILY #30 capsule 03/03/20 

[Rx]


Magnesium Oxide 400 mg PO DAILY #30 tablet 03/03/20 [Rx]


polyethylene glycoL 3350 [MiraLAX] 17 gm PO DAILY  packet 03/03/20 [Rx]


traMADol HCl [Tramadol HCl] 50 mg PO BID PRN #15 tablet 08/06/20 [Rx]











Past Medical History


HEENT History: Reports: Glaucoma, Hard of Hearing


Cardiovascular History: Reports: MI, Stents


Respiratory History: Reports: Asthma


Gastrointestinal History: Reports: Cholelithiasis


OB/GYN History: Reports: Pregnancy


Musculoskeletal History: Reports: Other (See Below)


Other Musculoskeletal History: History of Fx left wrist "many years ago".





- Past Surgical History


Respiratory Surgical History: Reports: None


Musculoskeletal Surgical History: Reports: None


Dermatological Surgical History: Reports: None





Social & Family History





- Family History


HEENT: Reports: Glaucoma


Cardiac: Reports: Stent


Respiratory: Reports: Asthma


GI: Reports: Cholelithiasis





Review of Systems





- Review of Systems


Review Of Systems: Comprehensive ROS is negative, except as noted in HPI.





ED EXAM, GENERAL





- Physical Exam


Exam: See Below


Exam Limited By: No Limitations


General Appearance: Alert, WD/WN, No Apparent Distress


Eye Exam: Bilateral Eye: EOMI, Normal Inspection, PERRL


Ears: Normal External Exam, Normal TMs


Nose: Normal Inspection


Throat/Mouth: Normal Inspection


Head: Normocephalic.  No: Atraumatic (On the very crown of her head there is a 

small contusion with surrounding abrasion.  There is no subcutaneous emphysema 

bony deformity or crepitus.  The rest of the head and face is atraumatic.)


Neck: Normal Inspection, Supple, Non-Tender.  No: Tender Lateral, Tender Midline


Respiratory/Chest: No Respiratory Distress, Lungs Clear


Cardiovascular: Normal Peripheral Pulses, Regular Rate, Rhythm


Peripheral Pulses: 2+: Radial (L), Radial (R), Posterior Tibial (L), Posterior 

Tibial (R), Dorsalis Pedis (L), Dorsalis Pedis (R)


GI/Abdominal: Normal Bowel Sounds, Soft, Non-Tender, No Distention, Pelvis 

Stable


 (Female) Exam: Deferred


Rectal (Female) Exam: Deferred


Back Exam: Normal Inspection, Full Range of Motion.  No: Paraspinal Tenderness, 

Vertebral Tenderness


Extremities: No: Normal Inspection (She does have quite a bit of tenderness on 

palpation of the left mid humerus.  Question some crepitus and instability.  She

 has normal flexion and extension of the elbow.  And the CMS is appropriate.  

The rest of her extremities are atraumatic.)


Neurological: Alert, Oriented, CN II-XII Intact, Normal Cognition, No 

Motor/Sensory Deficits


Psychiatric: Normal Affect, Normal Mood


Skin Exam: Warm, Dry, Intact, Normal Color, No Rash





EKG INTERPRETATION


EKG Date: 08/06/20


Time: 12:31


Rhythm: NSR (60)


Rate (Beats/Min): 60


Axis: Normal


P-Wave: Present


QRS: Normal


ST-T: Normal


QT: Normal


Comparison: No Change





Course





- Orders/Labs/Meds


Orders: 


                               Active Orders 24 hr











 Category Date Time Status


 


 EKG Documentation Completion [RC] STAT Care  08/06/20 12:38 Active


 


 Humerus Lt [CR] Stat Exams  08/06/20 12:39 Ordered











Labs: 


                                Laboratory Tests











  08/06/20 08/06/20 08/06/20 Range/Units





  12:36 12:36 12:36 


 


WBC  6.3    (4.0-10.0)  x10^3/uL


 


RBC  3.78 L    (4.00-5.50)  x10^6/uL


 


Hgb  12.5  D    (12.0-16.0)  g/dL


 


Hct  35.7    (33.0-47.0)  %


 


MCV  94.4 H    (78.0-93.0)  fL


 


MCH  33.1 H    (26.0-32.0)  pg


 


MCHC  35.0    (32.0-36.0)  g/dL


 


RDW Coeff of Maryan  13.7    (10.0-15.0)  %


 


Plt Count  177    (130-400)  x10^3/uL


 


Neut % (Auto)  72.4    (50.0-80.0)  %


 


Lymph % (Auto)  16.5 L    (25.0-50.0)  %


 


Mono % (Auto)  7.9    (2.0-11.0)  %


 


Eos % (Auto)  3.0    (0.0-4.0)  %


 


Baso % (Auto)  0.2    (0.2-1.2)  %


 


PT   12.1   (9.5-12.3)  SEC


 


INR   1.1 L   (2.0-3.5)  


 


APTT     (25.6-32.8)  SEC


 


Sodium    131 L  (136-145)  mmol/L


 


Potassium    3.7  (3.5-5.1)  mmol/L


 


Chloride    97 L  ()  mmol/L


 


Carbon Dioxide    25  (21-32)  mmol/L


 


Anion Gap    12.7  (10-20)  mmol/L


 


BUN    21 H  (7-18)  mg/dL


 


Creatinine    0.8  (0.55-1.02)  mg/dL


 


Est Cr Clr Drug Dosing    TNP  


 


Estimated GFR (MDRD)    > 60  


 


Glucose    122 H  ()  mg/dL


 


Calcium    9.1  (8.5-10.1)  mg/dL


 


Corrected Calcium    9.34  (8.5-10.1)  mg/dL


 


Total Bilirubin    0.6  (0.2-1.0)  mg/dL


 


AST    65 H  (15-37)  U/L


 


ALT    45  (14-59)  U/L


 


Alkaline Phosphatase    110  ()  U/L


 


C-Reactive Protein    < 0.2  (<=0.9)  mg/dL


 


Total Protein    7.4  (6.4-8.2)  g/dL


 


Albumin    3.7  (3.4-5.0)  g/dL


 


Globulin    3.7  


 


Albumin/Globulin Ratio    1.00  


 


Ethyl Alcohol    < 3  (0-3)  mg/dL














  08/06/20 Range/Units





  12:36 


 


WBC   (4.0-10.0)  x10^3/uL


 


RBC   (4.00-5.50)  x10^6/uL


 


Hgb   (12.0-16.0)  g/dL


 


Hct   (33.0-47.0)  %


 


MCV   (78.0-93.0)  fL


 


MCH   (26.0-32.0)  pg


 


MCHC   (32.0-36.0)  g/dL


 


RDW Coeff of Maryan   (10.0-15.0)  %


 


Plt Count   (130-400)  x10^3/uL


 


Neut % (Auto)   (50.0-80.0)  %


 


Lymph % (Auto)   (25.0-50.0)  %


 


Mono % (Auto)   (2.0-11.0)  %


 


Eos % (Auto)   (0.0-4.0)  %


 


Baso % (Auto)   (0.2-1.2)  %


 


PT   (9.5-12.3)  SEC


 


INR   (2.0-3.5)  


 


APTT  26.7  (25.6-32.8)  SEC


 


Sodium   (136-145)  mmol/L


 


Potassium   (3.5-5.1)  mmol/L


 


Chloride   ()  mmol/L


 


Carbon Dioxide   (21-32)  mmol/L


 


Anion Gap   (10-20)  mmol/L


 


BUN   (7-18)  mg/dL


 


Creatinine   (0.55-1.02)  mg/dL


 


Est Cr Clr Drug Dosing   


 


Estimated GFR (MDRD)   


 


Glucose   ()  mg/dL


 


Calcium   (8.5-10.1)  mg/dL


 


Corrected Calcium   (8.5-10.1)  mg/dL


 


Total Bilirubin   (0.2-1.0)  mg/dL


 


AST   (15-37)  U/L


 


ALT   (14-59)  U/L


 


Alkaline Phosphatase   ()  U/L


 


C-Reactive Protein   (<=0.9)  mg/dL


 


Total Protein   (6.4-8.2)  g/dL


 


Albumin   (3.4-5.0)  g/dL


 


Globulin   


 


Albumin/Globulin Ratio   


 


Ethyl Alcohol   (0-3)  mg/dL











Meds: 


Medications














Discontinued Medications














Generic Name Dose Route Start Last Admin





  Trade Name Freq  PRN Reason Stop Dose Admin


 


Acetaminophen  1,000 mg  08/06/20 15:00  08/06/20 15:09





  Tylenol Extra Strength  PO  08/06/20 15:01  1,000 mg





  ONETIME ONE   Administration


 


Fentanyl  50 mcg  08/06/20 12:44  08/06/20 12:50





  Sublimaze  IVPUSH  08/06/20 12:45  50 mcg





  ONETIME ONE   Administration


 


Fentanyl  50 mcg  08/06/20 13:47  08/06/20 13:57





  Sublimaze  IVPUSH  08/06/20 13:48  50 mcg





  ONETIME ONE   Administration


 


Ondansetron HCl  4 mg  08/06/20 12:44  08/06/20 12:50





  Zofran  IV  08/06/20 12:45  4 mg





  ONETIME ONE   Administration














- Radiology Interpretation


Free Text/Narrative:: 





CT of the head per radiology shows no acute intracranial process.  Left parietal

 occipital scalp hematoma without underlying calvarial fracture.





Chest x-ray per radiology shows no acute cardiopulmonary abnormality.





X-ray of the left hip with pelvis per radiology no acute findings.





I did review the x-ray of the humerus that was completed in [facility] prior to 

arrival.  This was extemporaneously reviewed by myself.  There is a transverse 

midshaft displaced humerus fracture about the width of the bone.  There is 

healing of the proximal humeral head fracture.  No other acute bony abnormality.

  Radiological review to follow.





- Re-Assessments/Exams


Free Text/Narrative Re-Assessment/Exam: 





08/06/20


A trauma code was activated upon the patient arrivals. 











08/06/20 


Patient did receive prophylactic Zofran for nausea and fentanyl with good pain 

control.





X-rays were rather unremarkable that were completed here other than the x-ray 

that was done in the clinic showing the midshaft femur fracture.  I did speak 

with Dr. CHRISTOPHE Horton at [facility] in [place]. HPI ER Course findings and concerns of the 

displaced humerus fracture with the concerns his guidance for a bucket sling and

 see ortho in a week for recheck. 





I discussed the plan of care with the patient and her son. They are comfortable 

with this plan and their questions answered. 














Departure





- Departure


Time of Disposition: 14:20


Disposition: Home, Self-Care 01


Clinical Impression: 


 Long term (current) use of antithrombotics/antiplatelets





Scalp contusion


Qualifiers:


 Encounter type: initial encounter Qualified Code(s): S00.03XA - Contusion of 

scalp, initial encounter





Humerus shaft fracture


Qualifiers:


 Encounter type: initial encounter Fracture type: closed Fracture morphology: 

transverse Fracture alignment: displaced Laterality: left Qualified Code(s): 

S42.322A - Displaced transverse fracture of shaft of humerus, left arm, initial 

encounter for closed fracture








- Discharge Information


Prescriptions: 


traMADol HCl [Tramadol HCl] 50 mg PO BID PRN #15 tablet


 PRN Reason: Pain


Instructions:  Humerus Fracture Treated With Immobilization, Easy-to-Read, How 

to Use Cold Therapy, Easy-to-Read, Facial or Scalp Contusion, Easy-to-Read


Referrals: 


Dixie Justice MD [Primary Care Provider] - 


Additional Instructions: 


Tylenol as needed for pain. 


Ice to the sore areas as much as possible. See discharge instruction sheet. 


Sling at all times to the left arm. 


If pain not controlled with above. Tramadol 1 tablet twice daily as needed for 

pain. Caution sedation. Rx to [facility]. 


Call [facility] Today and make appointment for recheck in 1 week. 

[phone]. You saw Dr. Valdes previously for your injuries. 


Return to the ED if new or worsening symptoms. 


Follow up with PCP in the next 4-6 days if any concerns otherwise see ortho in a

week. 





- My Orders


Last 24 Hours: 


My Active Orders





08/06/20 12:38


EKG Documentation Completion [RC] STAT 





08/06/20 12:39


Humerus Lt [CR] Stat 














- Assessment/Plan


Last 24 Hours: 


My Active Orders





08/06/20 12:38


EKG Documentation Completion [RC] STAT 





08/06/20 12:39


Humerus Lt [CR] Stat 











Assessment:: 





Scalp contusion abrasion. 


Left humerus fracture. 


Long term anti-platelet therapy. 


Plan: 





Tylenol as needed for pain. 


Ice to the sore areas as much as possible. See discharge instruction sheet. 


Sling at all times to the left arm. 


If pain not controlled with above. Tramadol 1 tablet twice daily as needed for 

pain. Caution sedation. Rx to [facility]. 


Call [facility] Today and make appointment for recheck in 1 week. 

[phone]. You saw Dr. Valdes previously for your injuries. 


Return to the ED if new or worsening symptoms. 


Follow up with PCP in the next 4-6 days if any concerns otherwise see ortho in a

 week.

## 2020-10-26 ENCOUNTER — HOSPITAL ENCOUNTER (EMERGENCY)
Dept: HOSPITAL 50 - VM.ED | Age: 85
Discharge: HOME | End: 2020-10-26
Payer: MEDICARE

## 2020-10-26 VITALS — SYSTOLIC BLOOD PRESSURE: 152 MMHG | DIASTOLIC BLOOD PRESSURE: 75 MMHG | HEART RATE: 71 BPM

## 2020-10-26 DIAGNOSIS — Z79.02: ICD-10-CM

## 2020-10-26 DIAGNOSIS — J45.909: ICD-10-CM

## 2020-10-26 DIAGNOSIS — Z79.82: ICD-10-CM

## 2020-10-26 DIAGNOSIS — I25.2: ICD-10-CM

## 2020-10-26 DIAGNOSIS — Z91.048: ICD-10-CM

## 2020-10-26 DIAGNOSIS — Z79.899: ICD-10-CM

## 2020-10-26 DIAGNOSIS — Z88.1: ICD-10-CM

## 2020-10-26 DIAGNOSIS — S20.212A: Primary | ICD-10-CM
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
## 2020-10-26 DIAGNOSIS — Z95.5: ICD-10-CM

## 2020-10-26 DIAGNOSIS — Z91.030: ICD-10-CM

## 2020-10-26 NOTE — EDM.PDOC
ED HPI GENERAL MEDICAL PROBLEM





- General


Chief Complaint: Back Pain or Injury


Stated Complaint: Fall \ Back Pain


Time Seen by Provider: 10/26/20 00:10


Source of Information: Reports: Patient


History Limitations: Reports: No Limitations





- History of Present Illness


INITIAL COMMENTS - FREE TEXT/NARRATIVE: 





Pt. presents to ER with complaints of L posterolateral chest pain post fall. She

is a resident at [facility], and states that she fell reaching for her slippers.

Denies striking her head. No neck injury. She states that she fell with her 

chest against a stool from a standing height which caused the chest injury.


Denies any chest pain. No shortness of breath. Denies any lightheadedness. No 

weakness, numbness, tingling or other worrisome signs or symptoms pre or post 

fall. Her only complaint is discomfort and swelling to the area. 


Onset: Today


Onset Date: 10/26/20


Location: Reports: Chest, Back


Quality: Reports: Ache


Severity: Mild


Treatments PTA: Reports: Cold Therapy


  ** Left Lower Back


Pain Score (Numeric/FACES): 8





- Related Data


                                    Allergies











Allergy/AdvReac Type Severity Reaction Status Date / Time


 


amoxicillin [Amoxicillin] Allergy Severe Swollen Verified 10/26/20 00:17





   Tongue  


 


doxycycline Allergy Intermediate Rash Verified 10/26/20 00:17


 


wasp Allergy Severe Anaphylactic Uncoded 10/26/20 00:17





   Shock  


 


seasonal Allergy  Other Uncoded 10/26/20 00:17











Home Meds: 


                                    Home Meds





Aspirin [Ecotrin EC] 162 mg PO DAILY 03/11/15 [History]


Calcium Carbonate/Vitamin D3 [Caltrate 600 Plus D3 Tablet] 1 tab PO DAILY 

03/11/15 [History]


Fluticasone Propionate [Flovent HFA] 2 puff INH BID PRN 03/11/15 [History]


Lutein 20 mg PO DAILY 03/11/15 [History]


Simvastatin 10 mg PO DAILY 03/11/15 [History]


timoloL maleate [Timoptic 0.5% Ophth Soln] 1 drop EYEBOTH BID 03/11/15 [History]


Albuterol [Ventolin HFA] 2 puff .XX Q4H PRN 02/28/20 [History]


Amiodarone HCl [Pacerone] 100 mg PO DAILY 02/28/20 [History]


Carboxymethylcellulos/Glycerin [Refresh Optive Gel Eye Drops] 10 ml OP 

ASDIRECTED 02/28/20 [History]


Clopidogrel [Plavix] 75 mg PO DAILY 02/28/20 [History]


Fenofibrate Nanocrystallized [Tricor] 145 mg PO DAILY 02/28/20 [History]


Furosemide 1 tab PO DAILY 02/28/20 [History]


Multivitamin with Minerals [Multivitamins with Minerals] 1 each PO DAILY 

02/28/20 [History]


Nitroglycerin [Nitrostat] 0.4 mg SL ASDIRECTED PRN 02/28/20 [History]


Omeprazole 20 mg PO DAILY 02/28/20 [History]


Simethicone 80 mg PO Q4H PRN 02/28/20 [History]


Sodium Chloride 5% [Barbara 128 5% Ophth Oint] 3.5 gm EYEBOTH BEDTIME 02/28/20 [H

istory]


amLODIPine [Norvasc] 5 mg PO DAILY 02/28/20 [History]


carvediloL [Coreg] 6.25 mg PO BID 02/28/20 [History]


clonazePAM [Clonazepam] 0.5 tab PO QAM 02/28/20 [History]


clonazePAM [Clonazepam] 1 tab PO BEDTIME 02/28/20 [History]


cycloSPORINE [Restasis] 1 each OP BID 02/28/20 [History]


lisinopriL [Lisinopril] 1 tab PO BID 02/28/20 [History]


minoxidiL [Minoxidil] 60 ml TP ASDIRECTED 02/28/20 [History]


Iron Polysaccharide Complex [Ferric X-150] 150 mg PO DAILY #30 capsule 03/03/20 

[Rx]


Magnesium Oxide 400 mg PO DAILY #30 tablet 03/03/20 [Rx]


polyethylene glycoL 3350 [MiraLAX] 17 gm PO DAILY  packet 03/03/20 [Rx]


traMADol HCl [Tramadol HCl] 50 mg PO BID PRN #15 tablet 08/06/20 [Rx]











Past Medical History


HEENT History: Reports: Glaucoma, Hard of Hearing


Cardiovascular History: Reports: MI, Stents


Respiratory History: Reports: Asthma


Gastrointestinal History: Reports: Cholelithiasis


OB/GYN History: Reports: Pregnancy


Musculoskeletal History: Reports: Other (See Below)


Other Musculoskeletal History: History of Fx left wrist "many years ago".





- Past Surgical History


Respiratory Surgical History: Reports: None


Musculoskeletal Surgical History: Reports: None


Dermatological Surgical History: Reports: None





Social & Family History





- Family History


HEENT: Reports: Glaucoma


Cardiac: Reports: Stent


Respiratory: Reports: Asthma


GI: Reports: Cholelithiasis





- Tobacco Use


Tobacco Use Status *Q: Never Tobacco User





- Recreational Drug Use


Recreational Drug Use: No





ED ROS GENERAL





- Review of Systems


Review Of Systems: See Below


Constitutional: Reports: No Symptoms


HEENT: Reports: No Symptoms


Respiratory: Reports: Pleuritic Chest Pain.  Denies: Shortness of Breath, 

Wheezing, Cough, Hemoptysis


Cardiovascular: Reports: No Symptoms


Endocrine: Reports: No Symptoms


GI/Abdominal: Reports: No Symptoms


: Reports: No Symptoms


Musculoskeletal: Reports: No Symptoms


Skin: Reports: No Symptoms


Neurological: Reports: No Symptoms


Psychiatric: Reports: No Symptoms


Hematologic/Lymphatic: Reports: No Symptoms


Immunologic: Reports: No Symptoms





ED EXAM, GENERAL





- Physical Exam


Exam: See Below


Exam Limited By: No Limitations


General Appearance: Alert, WD/WN, No Apparent Distress


Eye Exam: Bilateral Eye: EOMI


Nose: Normal Inspection, No Blood


Throat/Mouth: Normal Inspection, Normal Lips, Normal Voice, No Airway Compromise


Head: Atraumatic, Normocephalic


Neck: Normal Inspection, Supple, Non-Tender, Full Range of Motion


Respiratory/Chest: No Respiratory Distress, Lungs Clear, Normal Breath Sounds, 

No Accessory Muscle Use, Other (edema to L posteriolateral chest wall. No 

obvious crepitus. Lung sounds are clear. Pt. has good air entry.)


Cardiovascular: Normal Peripheral Pulses, Regular Rate, Rhythm, No Edema, No 

JVD, No Murmur


Peripheral Pulses: 4+: Radial (L)


GI/Abdominal: Soft, Non-Tender, No Mass


 (Female) Exam: Deferred


Rectal (Female) Exam: Deferred


Back Exam: No: Paraspinal Tenderness, Vertebral Tenderness





Course





- Vital Signs


Last Recorded V/S: 


                                Last Vital Signs











Temp  36.6 C   10/26/20 00:30


 


Pulse  71   10/26/20 00:30


 


Resp  20   10/26/20 00:30


 


BP  152/75 H  10/26/20 00:30


 


Pulse Ox  95   10/26/20 00:30














- Orders/Labs/Meds


Orders: 


                               Active Orders 24 hr











 Category Date Time Status


 


 Chest 2V [CR] Stat Exams  10/26/20 00:25 Taken














Departure





- Departure


Time of Disposition: 01:20


Disposition: Home, Self-Care 01


Clinical Impression: 


 Chest wall contusion








- Discharge Information


Instructions:  Contusion


Referrals: 


Dixie Justice MD [Primary Care Provider] - 


Forms:  ED Department Discharge


Additional Instructions: 


Home to rest.





Tylenol as needed for pain.





You can use ice or heat as needed for discomfort.





Return to ER if you have any shortness of breath, increased discomfort, or 

lightheadedness. You will probably see some bruising tomorrow to the area.








Sepsis Event Note (ED)





- Evaluation


Sepsis Screening Result: No Definite Risk





- Focused Exam


Vital Signs: 


                                   Vital Signs











  Temp Pulse Resp BP Pulse Ox


 


 10/26/20 00:30  36.6 C  71  20  152/75 H  95














- Problem List Review


Problem List Initiated/Reviewed/Updated: Yes





- My Orders


Last 24 Hours: 


My Active Orders





10/26/20 00:25


Chest 2V [CR] Stat 














- Assessment/Plan


Last 24 Hours: 


My Active Orders





10/26/20 00:25


Chest 2V [CR] Stat 











Plan: 





Home to rest.





Tylenol as needed for pain.





You can use ice or heat as needed for discomfort.





Return to ER if you have any shortness of breath, increased discomfort, or 

lightheadedness. You will probably see some bruising tomorrow to the area.

## 2020-10-26 NOTE — CR
______________________________________________________________________________   

  

[id number] RAD/RAD Chest PA And Lateral  

EXAM:  RAD Chest PA And Lateral  

   

 INDICATION:  FALL, L POSTERIOLATERAL CHEST PAIN  

   

 COMPARISON:  August 6, 2020.  

   

 DISCUSSION:    

   

 Cardiomediastinal silhouette is normal in size and contour.  

   

 No pleural effusion or pneumothorax. Scarring in the right lung base.  

   

 Incidentally noted is proximal left humeral diaphysis fracture with mineralized  

 callus formation.  

   

 Chronic healed left proximal humeral metaphysis fracture as well.  

   

 IMPRESSION:  

   

 As above.  

   

 Electronically signed by Chon Cardoza MD on 10/26/2020 8:38 AM  

   

  

Chon Cardoza MD                 

 10/26/20 0840    

  

Thank you for allowing us to participate in the care of your patient.